# Patient Record
Sex: FEMALE | Race: WHITE
[De-identification: names, ages, dates, MRNs, and addresses within clinical notes are randomized per-mention and may not be internally consistent; named-entity substitution may affect disease eponyms.]

---

## 2018-10-02 ENCOUNTER — HOSPITAL ENCOUNTER (INPATIENT)
Dept: HOSPITAL 89 - OB | Age: 28
LOS: 2 days | Discharge: HOME | End: 2018-10-04
Attending: OBSTETRICS & GYNECOLOGY | Admitting: OBSTETRICS & GYNECOLOGY
Payer: MEDICAID

## 2018-10-02 VITALS
HEIGHT: 66 IN | BODY MASS INDEX: 40.18 KG/M2 | WEIGHT: 250 LBS | WEIGHT: 250 LBS | BODY MASS INDEX: 40.18 KG/M2 | HEIGHT: 66 IN

## 2018-10-02 VITALS — SYSTOLIC BLOOD PRESSURE: 135 MMHG | DIASTOLIC BLOOD PRESSURE: 82 MMHG

## 2018-10-02 VITALS — SYSTOLIC BLOOD PRESSURE: 137 MMHG | DIASTOLIC BLOOD PRESSURE: 72 MMHG

## 2018-10-02 VITALS — SYSTOLIC BLOOD PRESSURE: 145 MMHG | DIASTOLIC BLOOD PRESSURE: 86 MMHG

## 2018-10-02 DIAGNOSIS — Z3A.39: ICD-10-CM

## 2018-10-02 DIAGNOSIS — O36.0130: ICD-10-CM

## 2018-10-02 DIAGNOSIS — E66.9: ICD-10-CM

## 2018-10-02 DIAGNOSIS — O32.6XX0: ICD-10-CM

## 2018-10-02 LAB — PLATELET COUNT, AUTOMATED: 243 K/UL (ref 150–450)

## 2018-10-02 PROCEDURE — 86901 BLOOD TYPING SEROLOGIC RH(D): CPT

## 2018-10-02 PROCEDURE — 0HQ9XZZ REPAIR PERINEUM SKIN, EXTERNAL APPROACH: ICD-10-PCS | Performed by: OBSTETRICS & GYNECOLOGY

## 2018-10-02 PROCEDURE — 3E0334Z INTRODUCTION OF SERUM, TOXOID AND VACCINE INTO PERIPHERAL VEIN, PERCUTANEOUS APPROACH: ICD-10-PCS | Performed by: OBSTETRICS & GYNECOLOGY

## 2018-10-02 PROCEDURE — 86850 RBC ANTIBODY SCREEN: CPT

## 2018-10-02 PROCEDURE — 36415 COLL VENOUS BLD VENIPUNCTURE: CPT

## 2018-10-02 PROCEDURE — 85025 COMPLETE CBC W/AUTO DIFF WBC: CPT

## 2018-10-02 PROCEDURE — 85461 HEMOGLOBIN FETAL: CPT

## 2018-10-02 PROCEDURE — 86703 HIV-1/HIV-2 1 RESULT ANTBDY: CPT

## 2018-10-02 PROCEDURE — 85027 COMPLETE CBC AUTOMATED: CPT

## 2018-10-02 PROCEDURE — 86900 BLOOD TYPING SEROLOGIC ABO: CPT

## 2018-10-02 RX ADMIN — HYDROCODONE BITARTRATE AND ACETAMINOPHEN PRN EACH: 5; 325 TABLET ORAL at 22:32

## 2018-10-02 RX ADMIN — IBUPROFEN SCH MG: 800 TABLET ORAL at 19:33

## 2018-10-02 RX ADMIN — Medication PRN ML: at 10:12

## 2018-10-02 RX ADMIN — SODIUM CHLORIDE, SODIUM LACTATE, POTASSIUM CHLORIDE, AND CALCIUM CHLORIDE SCH MLS/HR: 600; 310; 30; 20 INJECTION, SOLUTION INTRAVENOUS at 10:57

## 2018-10-02 RX ADMIN — SODIUM CHLORIDE, SODIUM LACTATE, POTASSIUM CHLORIDE, AND CALCIUM CHLORIDE SCH MLS/HR: 600; 310; 30; 20 INJECTION, SOLUTION INTRAVENOUS at 12:02

## 2018-10-02 RX ADMIN — SODIUM CHLORIDE, SODIUM LACTATE, POTASSIUM CHLORIDE, AND CALCIUM CHLORIDE SCH MLS/HR: 600; 310; 30; 20 INJECTION, SOLUTION INTRAVENOUS at 09:29

## 2018-10-02 RX ADMIN — DOCUSATE CALCIUM SCH MG: 240 CAPSULE, LIQUID FILLED ORAL at 19:33

## 2018-10-02 RX ADMIN — SODIUM CHLORIDE, SODIUM LACTATE, POTASSIUM CHLORIDE, AND CALCIUM CHLORIDE SCH MLS/HR: 600; 310; 30; 20 INJECTION, SOLUTION INTRAVENOUS at 15:42

## 2018-10-02 RX ADMIN — WITCH HAZEL PRN PKG: 5 CLOTH TOPICAL at 19:33

## 2018-10-02 RX ADMIN — Medication PRN ML: at 19:33

## 2018-10-02 RX ADMIN — Medication PRN GM: at 19:33

## 2018-10-02 NOTE — ANESTHESIA OB PRE-ANES EVAL
History of Present Illness


Anesthesia Start Date:  Oct 2, 2018


Anesthesia Start Time:  09:35


OB Anesthesia Diagnosis:  spontaneous labor


Current Pregnancy Complication:  obesity


:  1


Result Diagram:  


10/2/18 0905





Weight (Pounds):  250





Past Medical History


Medical History:  asthma (No recent exacerbations)


Surgical History:  noncontributory


Previous Anesthesia:  general


Attended Childbirth Classes?:  No


Hx Anesthesia Reactions:  No


Hx Family Anesthesia Reaction:  No


Past Pregnancy Complications:  obesity, other (Miscarriage X1, Ectopic X2)


Home Meds


Active Scripts


Albuterol Sul Hfa 90 Mcg 8 Gm (VENTOLIN HFA 90 MCG 8 GM) 8.5 Gm Hfa.aer.ad, 2 


PUFF IH Q4-6H, #1


   Prov:KAREN HAYES DO         4/25/15


Reported Medications


Pnv #116/Iron Fumarate/Fa/Dha (EXPECTA PRENATAL COMBO PACK) 1 Each Combo..pkg


   18


Mometasone/Formoterol (DULERA 100 MCG/5 MCG INHALER) 13 Gm Inh, 2 PUFF INH QDAY,


INH


   8/8/15


Allergies:  


Coded Allergies:  


     No Known Drug Allergies (Unverified , 10/2/18)





Anesthesia OB ROS


Pulmonary:  smoker (pks/day/yrs)


Airway Class:  ll


GI ROS:  clear liquids, ice chips


Last Solids Date:  Oct 2, 2018


Last Solids Time:  04:00


ASA Classification:  2





Assessment and Plan


Anesthesia Plan:  CSE


Anesthesia Stop Day:  Oct 2, 2018


Anesthesia Stop Time:  17:50


Epidural Catheter Removal:  Removed by: (Catheter will be removed by RN at more 


convenient time.)











REJI HENRIQUEZ CRNA              Oct 2, 2018 11:57

## 2018-10-02 NOTE — PROCEDURE NOTE
Anesthetic Placement Note


Anesthesia Plan:  CSE


Permit for Anesthesia Signed:  Yes


Anesthesia Technique:  Patient Sitting


Anesthesia Prep:  Betadine


Interspace:  L 4-5


Local Anesthetic:  1% Lidocaine, 25 Gauge Needle


Amount Local - cc's:  6


Anesthesia Needle:  17g Touhy/Schliff


Anesthesia Attempts:  3


Loss of Resistance:  Normal Saline


Depth of SHAWN (cm):  9


Intrathecal Needle:  27 Gauge Pencan


Cerebral Spinal Fluid:  Yes, Clear


Catheter Insertion (cm):  6


Catheter Type:  Betancourt - Spring Wound


Epidural Dressing:  Tegaderm, Tape, Adhesive Spray


Anesthesia Tray:  Lot Number ([id number]), Expiration Date (2019-09-30), Reference


Number ([id number])


Comment:


Attempted SAB L3-4 due to pt's inability to remain immobile, unsuccessful (pt. 


very nervous, jumpy).  Attempted epidural placement at L3-4 without success.  


Moved down to L4-5, able to place epidural needle using midline approach.  IT i


njection using 27ga needle, but not good CSF flow before injection (no heme or 


paresthesia).  Catheter threaded easily without heme or paresthesia, negative 


aspiration.  Good analgesia after loading doses with minimal motor block, R=L.





Anesthesia Medications:


Intrathecal Dose:  mcg Fentanyl (50), mg Marcaine MPF (2.5), Time (1000)


Epidural Test Dose:  1.5 Lido/Epi (1:200,000), Dose - mL (3), Time (1002), 


Negative (No symptoms IV or IT injection.)


Epidural Loading Dose:  0.2% Ropivicaine, With Fentanyl 2mcg/ml, Dose - ml (15),


Time (1010)


Epidural Infusion:  0.2% Ropivicaine, With Fentanyl 2mcg/ml, Start Time: (1021)


Epidural Pump Setting:  Bolus Dose - mL (5), Lockout - Minutes (15), Maintenance


Rate - mL/hr (10), Maximum per Hour - mL (25)


Complications:  None


Comment:


Epidural infusion stopped from 8339-2290 at pt's request because of profound 


sensory/motor block BLE's.  Infusion restarted at 1430 at rate of 8ml/hour. 


otherwise unchanged.  1645 Pt now C/O increased pain with contractions and more 


pronounced perineal pain.  Epidural bolus dose of 10ml using infusion mix. 1710 


Pt reports discomfort greatly improved, pushing.  1750 Epidural infusion D/C 


after uneventful vaginal delivery.











REJI HENRIQUEZ CRNA              Oct 2, 2018 12:11

## 2018-10-02 NOTE — HISTORY & PHYSICAL
History of Present Illness


Age of Patient:  28


:  4


Para or TPAL:  0


EDC per LMP:  Oct 4, 2018


Estimated Gestational Age:  39.5


Chief Complaint


Labor


History of Present Illness


Presents for labor this morning.  Reports contractions regular and painful since


2-3 am.  No leaking fluid.  Pregnancy uncomplicated to date other than obesity. 


Presented at 6.5 cm and is now 9 cm.  Station is still very high however.  Past 


Medical, Surgical, Family and Obstetric Histories reviewed. Please see [facility] 


prenatal chart.





History


Allergies:  


Coded Allergies:  


     No Known Drug Allergies (Unverified , 10/2/18)





Med Rec


Home Meds


Active Scripts


Albuterol Sul Hfa 90 Mcg 8 Gm (VENTOLIN HFA 90 MCG 8 GM) 8.5 Gm Hfa.aer.ad, 2 


PUFF IH Q4-6H, #1


   Prov:KAREN HAYES DO         4/25/15


Reported Medications


Pnv #116/Iron Fumarate/Fa/Dha (EXPECTA PRENATAL COMBO PACK) 1 Each Combo..pkg


   18


Mometasone/Formoterol (DULERA 100 MCG/5 MCG INHALER) 13 Gm Inh, 2 PUFF INH QDAY,


INH


   8/8/15





Review of Systems


All Systems Reviewed/Normal:  Yes, Except as Noted





Exam


General Exam


General Apperance:  Alert/Awake/No Acute Distress


Neuro:  No Gross deficits


Eyes:  Normal Extraocular Movement & Vison


Cardiovascular:  Regular Rate and Rhythm


Respiratory:  No Respiratory Distress, Clear to Auscultation


Abdomen:  Soft, Non-Tender, Non-Distended


Extremities:  No Cyanosis,Clubbing or Edema


Integumentary:  Skin Intact without Lesions or Rash


Psychological:  Alert & Oriented X3, Appropriate Mood & Affect





Pregnancy


Cervical Dialation:  9


Cervical Effacement (%):  100


Fetal Station:  -1


Fetal Presentation:  Vertex





Fetus


Fetal Heart Tone Variabilty:  Moderate


FHT Accelerations:  15X15


FHT Category:  I





Medical Decision Making


Data Points


Result Diagram:  


10/2/18 0905








Assessment and Plan


OB/GYN Plan:  Routine Labor Care


Problems:  


(1) 39 weeks gestation of pregnancy


(2) Normal labor











MENG DAY MD              Oct 2, 2018 13:39

## 2018-10-02 NOTE — OB DELIVERY NOTE
Delivery Note


Vaginal Delivery Type:  Spont. Vaginal Delivery


Delivery Date:  Oct 2, 2018


Delivery Time:  17:38


Estimated Gestational Age(wks):  39.5


Delivery Anesthesia:  Epidural


Infant Sex:  Male


Infant Weight (gms):  3430


[place] Apgars:  1 Minute (9), 5 Minute (10)


Repair Needed:  Laceration, 1st Degree


Estimated Blood Loss:  300


Delivery Complications:  Laceration


Notes:


Pt in dorsal lithotomy position and pushing effectively.  Brought vtx to 


 position and delivered over first degree lacerations.  Compound 


presentation in ANN position with left hand up by infants chin.  Nuchal cord x 


1.  Remainder of baby delivered without difficulty.  Placenta delivered intact 


and spontaneous.  Laceration repaired with 2-0 Chromic without complication.  


Bleeding light and uterus firm.


Pediatrician in Attendence:  No


Copies to:   MENG DAY MD ;











MENG DAY MD              Oct 2, 2018 18:35

## 2018-10-03 VITALS — DIASTOLIC BLOOD PRESSURE: 73 MMHG | SYSTOLIC BLOOD PRESSURE: 127 MMHG

## 2018-10-03 VITALS — DIASTOLIC BLOOD PRESSURE: 73 MMHG | SYSTOLIC BLOOD PRESSURE: 122 MMHG

## 2018-10-03 VITALS — DIASTOLIC BLOOD PRESSURE: 73 MMHG | SYSTOLIC BLOOD PRESSURE: 130 MMHG

## 2018-10-03 VITALS — SYSTOLIC BLOOD PRESSURE: 110 MMHG | DIASTOLIC BLOOD PRESSURE: 66 MMHG

## 2018-10-03 VITALS — SYSTOLIC BLOOD PRESSURE: 122 MMHG | DIASTOLIC BLOOD PRESSURE: 73 MMHG

## 2018-10-03 VITALS — DIASTOLIC BLOOD PRESSURE: 73 MMHG | SYSTOLIC BLOOD PRESSURE: 123 MMHG

## 2018-10-03 VITALS — DIASTOLIC BLOOD PRESSURE: 52 MMHG | SYSTOLIC BLOOD PRESSURE: 106 MMHG

## 2018-10-03 LAB — PLATELET COUNT, AUTOMATED: 209 K/UL (ref 150–450)

## 2018-10-03 RX ADMIN — IBUPROFEN SCH MG: 800 TABLET ORAL at 12:25

## 2018-10-03 RX ADMIN — DOCUSATE CALCIUM SCH MG: 240 CAPSULE, LIQUID FILLED ORAL at 20:07

## 2018-10-03 RX ADMIN — HYDROCODONE BITARTRATE AND ACETAMINOPHEN PRN EACH: 5; 325 TABLET ORAL at 12:25

## 2018-10-03 RX ADMIN — IBUPROFEN SCH MG: 800 TABLET ORAL at 04:16

## 2018-10-03 RX ADMIN — IBUPROFEN SCH MG: 800 TABLET ORAL at 20:07

## 2018-10-03 RX ADMIN — HYDROCODONE BITARTRATE AND ACETAMINOPHEN PRN EACH: 5; 325 TABLET ORAL at 07:48

## 2018-10-03 RX ADMIN — DOCUSATE CALCIUM SCH MG: 240 CAPSULE, LIQUID FILLED ORAL at 09:52

## 2018-10-03 NOTE — ANESTHESIA POST EVAL NOTE
Anesthesia Post Eval Note


Stabil, afebrile.


Pt able to participate in Eval:  Yes


Cardiovascular Status:  Satisfactory


Respiratory Status:  Satisfactory


Pain Managment:  Satisfactory


PO Nausea/Vomiting:  Satisfactory


Temperature Management:  Satisfactory


Mental Status:  Satisfactory, Alert, Oriented X3


Post-Op Hydration Status:  Satisfactory, Tolerating PO Well, Voiding w/o 


Difficulty


Anesthesia Type:  CSE


Anesthesia Tolerance:


Ambulatory without symptoms PDPH, no apparent complications.











REJI HENRIQUEZ CRNA              Oct 3, 2018 13:25

## 2018-10-03 NOTE — OB/GYN PROGRESS NOTE
OB Subjective


Progress Notes


Subjective


29 yo female post partum day 1 from spontaneous vaginal delivery. Doing well. 


Moderate pain, controlled with medication. Breast feeding, baby with good latch.


Moderate vaginal bleeding. Voiding well. Tolerating PO, no nausea/vomiting. 


Passing flatus.


GI:  POS Flatus; NEG Nausea, NEG Vomiting, NEG Bowel Movement


:  Voiding Well, Vaginal Bleeding, Moderate


Pain:  Moderate, Tolerating PO Pain Meds


Neurological:  No Headache


Eyes:  No Visual Disturbances





OB Objective


Physical Exam





Vital Signs








  Date Time  Temp Pulse Resp B/P (MAP) Pulse Ox O2 Delivery O2 Flow Rate FiO2


 


10/3/18 07:40 97.9 67 14 106/52 (70)  Room Air  


 


10/2/18 22:29     93   














Intake and Output 


 


 10/3/18





 06:59


 


Intake Total 3700 ml


 


Output Total 950 ml


 


Balance 2750 ml


 


 


 


Intake IV Total 3700 ml


 


Output Urine Total 950 ml


 


# Voids 3








General Appearance:  Alert/Awake/No Acute Distress


Neurological:  No Gross deficits


Eyes:  Normal Extraocular Movement & Vison


Cardiovascular:  Normal Rhythm & Peripheral Pulses


Respiratory:  No Respiratory Distress, Clear to Auscultation


Abdomen:  Fundus Firm, Tender (mild)


Extremities:  Edema (mild peripheral edema, no pitting)


Integumentary:  Skin Intact without Lesions or Rash


Psychological:  Alert & Oriented X3, Appropriate Mood & Affect


Result Diagram:  


10/3/18 0618








Assessment and Plan


OB/GYN Assessment:  Stable


OB/GYN Plan:  Routine Post-Partum Care, Discharge Home Tomorrow


Problems:  


(1) 39 weeks gestation of pregnancy


(2) Normal labor


Assessment & Plan:  Doing well post partum. Plan for discharge home tomorrow. 


Continue routine post-partum care.














JEAN DEJESUS           Oct 3, 2018 10:44

## 2018-10-04 VITALS — DIASTOLIC BLOOD PRESSURE: 82 MMHG | SYSTOLIC BLOOD PRESSURE: 129 MMHG

## 2018-10-04 VITALS — DIASTOLIC BLOOD PRESSURE: 77 MMHG | SYSTOLIC BLOOD PRESSURE: 133 MMHG

## 2018-10-04 VITALS — DIASTOLIC BLOOD PRESSURE: 77 MMHG | SYSTOLIC BLOOD PRESSURE: 130 MMHG

## 2018-10-04 RX ADMIN — IBUPROFEN SCH MG: 800 TABLET ORAL at 11:54

## 2018-10-04 RX ADMIN — Medication PRN ML: at 13:30

## 2018-10-04 RX ADMIN — IBUPROFEN SCH MG: 800 TABLET ORAL at 03:32

## 2018-10-04 RX ADMIN — HYDROCODONE BITARTRATE AND ACETAMINOPHEN PRN EACH: 5; 325 TABLET ORAL at 08:21

## 2018-10-04 RX ADMIN — WITCH HAZEL PRN PKG: 5 CLOTH TOPICAL at 13:30

## 2018-10-04 RX ADMIN — DOCUSATE CALCIUM SCH MG: 240 CAPSULE, LIQUID FILLED ORAL at 08:21

## 2018-10-04 RX ADMIN — Medication PRN GM: at 13:30

## 2018-10-04 NOTE — OB/GYN PROGRESS NOTE
OB Subjective


Progress Notes


Subjective


27 yo female post-partum day 2 from vaginal delivery. Doing well without issues 


overnight. Breast feeding, baby with good latch. Tolerating PO, no 


nausea/vomiting. Vaginal bleeding slowing down.


GI:  POS Flatus; NEG Nausea, NEG Vomiting


:  Voiding Well, Vaginal Bleeding, Moderate


Pain:  Moderate, Tolerating PO Pain Meds


Neurological:  No Headache


Eyes:  No Visual Disturbances





OB Objective


Physical Exam





Vital Signs








  Date Time  Temp Pulse Resp B/P (MAP) Pulse Ox O2 Delivery O2 Flow Rate FiO2


 


10/4/18 08:15 97.8 69 18 133/77 (95) 94 Room Air  














Intake and Output 


 


 10/4/18





 07:00


 


Intake Total 580 ml


 


Balance 580 ml


 


 


 


Intake Oral 580 ml


 


# Voids 1








General Appearance:  Alert/Awake/No Acute Distress


Neurological:  No Gross deficits


Eyes:  Normal Extraocular Movement & Vison


Cardiovascular:  Normal Rhythm & Peripheral Pulses


Respiratory:  No Respiratory Distress, Clear to Auscultation


Abdomen:  Fundus Firm, Tender (mild)


Extremities:  Edema (mild peripheral edema, no pitting - improved)


Integumentary:  Skin Intact without Lesions or Rash


Psychological:  Alert & Oriented X3, Appropriate Mood & Affect


Result Diagram:  


10/3/18 0618








Assessment and Plan


OB/GYN Assessment:  Stable


OB/GYN Plan:  Routine Post-Partum Care, Discharge Home Today


Problems:  


(1) 39 weeks gestation of pregnancy


(2) Normal labor


Assessment & Plan:  Doing well postpartum, discharge home today. Plan postpartum


follow-up in 6 weeks














JEAN DEJESUS           Oct 4, 2018 11:11

## 2018-10-04 NOTE — OB/GYN DISCHARGE SUMMARY
Discharge Summary


Reason for Hosp/Final Diag:  


(1) 39 weeks gestation of pregnancy


(2) Normal labor


Hospital Course & Plan:  Doing well post partum. Plan for discharge home 


tomorrow. Continue routine post-partum care.





Lates Vital Signs





Vital Signs








  Date Time  Temp Pulse Resp B/P (MAP) Pulse Ox O2 Delivery O2 Flow Rate FiO2


 


10/4/18 03:05 97.6 58 18 129/82 (98) 95   


 


10/3/18 20:20      Room Air  








Weight (Pounds):  250


Result Diagram:  


10/3/18 0618





Condition:  Improved


Discharge:  Home, Self Care


Home Meds


Active Scripts


Ondansetron (ZOFRAN ODT) 4 Mg Tab.rapdis, 4 MG PO Q8H for Nausea, #10 TAB.SOL


   Prov:JEAN DEJESUS         10/4/18


Ibuprofen (IBUPROFEN) 800 Mg Tablet, 1 TAB PO Q8H, #30 TAB 0 Refills


   Take with food every 8 hours.


   Prov:JEAN DEJESUS         10/4/18


Hydrocodone Bit/Acetaminophen (NORCO 5-325 TABLET) 1 Each Tablet, 1 EACH PO Q4-


6H for PAIN, #6 TAB


   Prov:JEAN DEJESUS         10/4/18


Albuterol Sul Hfa 90 Mcg 8 Gm (VENTOLIN HFA 90 MCG 8 GM) 8.5 Gm Hfa.aer.ad, 2 


PUFF IH Q4-6H, #1


   Prov:KAREN HAYES DO         4/25/15


Reported Medications


Pnv #116/Iron Fumarate/Fa/Dha (EXPECTA PRENATAL COMBO PACK) 1 Each Combo..pkg


   9/17/18


Mometasone/Formoterol (DULERA 100 MCG/5 MCG INHALER) 13 Gm Inh, 2 PUFF INH QDAY,


INH


   8/8/15


[facility]  [phone], Dr. Stokes [phone]


Follow up in:  6 wks PP or PO


Discharge Diet:  As Tolerates


Discharge Activity:  As Tolerates, Pelvic Rest











JEAN DEJESUS           Oct 4, 2018 09:05